# Patient Record
Sex: FEMALE | Race: BLACK OR AFRICAN AMERICAN | NOT HISPANIC OR LATINO | Employment: FULL TIME | URBAN - METROPOLITAN AREA
[De-identification: names, ages, dates, MRNs, and addresses within clinical notes are randomized per-mention and may not be internally consistent; named-entity substitution may affect disease eponyms.]

---

## 2023-05-24 ENCOUNTER — OFFICE VISIT (OUTPATIENT)
Dept: PRIMARY CARE | Facility: CLINIC | Age: 58
End: 2023-05-24
Payer: MEDICAID

## 2023-05-24 VITALS
TEMPERATURE: 98.3 F | RESPIRATION RATE: 16 BRPM | HEIGHT: 63 IN | WEIGHT: 194.2 LBS | DIASTOLIC BLOOD PRESSURE: 81 MMHG | OXYGEN SATURATION: 99 % | HEART RATE: 76 BPM | SYSTOLIC BLOOD PRESSURE: 154 MMHG | BODY MASS INDEX: 34.41 KG/M2

## 2023-05-24 DIAGNOSIS — R10.9 FLANK PAIN: ICD-10-CM

## 2023-05-24 DIAGNOSIS — M79.602 LEFT ARM PAIN: ICD-10-CM
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
## 2023-05-24 DIAGNOSIS — T14.8XXA BRUISING: ICD-10-CM

## 2023-05-24 PROCEDURE — 99214 OFFICE O/P EST MOD 30 MIN: CPT | Performed by: NURSE PRACTITIONER

## 2023-05-24 RX ORDER — CYCLOBENZAPRINE HCL 10 MG
10 TABLET ORAL
COMMUNITY
Start: 2023-05-20

## 2023-05-24 RX ORDER — OXYCODONE AND ACETAMINOPHEN 5; 325 MG/1; MG/1
TABLET ORAL
COMMUNITY
Start: 2023-05-20

## 2023-05-24 ASSESSMENT — ENCOUNTER SYMPTOMS
COUGH: 0
HEMATURIA: 0
ROS GI COMMENTS: LEFT FLANK PAIN
FATIGUE: 0
UNEXPECTED WEIGHT CHANGE: 0
BLOOD IN STOOL: 0
CHEST TIGHTNESS: 0
RECTAL PAIN: 0
TROUBLE SWALLOWING: 0
DIARRHEA: 0
PHOTOPHOBIA: 0
APNEA: 0
JOINT SWELLING: 0
CHOKING: 0
BRUISES/BLEEDS EASILY: 0
HEADACHES: 0
ACTIVITY CHANGE: 0
VOMITING: 0
ABDOMINAL DISTENTION: 0
WHEEZING: 0
ADENOPATHY: 0
WOUND: 0
CONSTIPATION: 0
ANAL BLEEDING: 0
APPETITE CHANGE: 0
NECK PAIN: 0
NAUSEA: 0
NECK STIFFNESS: 0
DIAPHORESIS: 0
CHILLS: 0
WEAKNESS: 0
DIZZINESS: 0
STRIDOR: 0
FEVER: 0
BACK PAIN: 0
LIGHT-HEADEDNESS: 0
SHORTNESS OF BREATH: 0
PALPITATIONS: 0
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
## 2023-07-21 ENCOUNTER — APPOINTMENT (OUTPATIENT)
Dept: PRIMARY CARE | Facility: CLINIC | Age: 58
End: 2023-07-21
Payer: MEDICAID

## 2023-10-06 ENCOUNTER — TELEPHONE (OUTPATIENT)
Dept: PRIMARY CARE | Facility: CLINIC | Age: 58
End: 2023-10-06
Payer: MEDICAID

## 2023-10-06 NOTE — TELEPHONE ENCOUNTER
Najma crawford phoned, [phone].  She has an appointment scheduled for this Monday October 9th @ 8:45. She hurt her foot about a month ago and it still hurts.  She is requesting a refill on her prednisone.     Prednisone  Dosage 20mg   Once a day  Pharmacy: [facility] on [facility]

## 2023-10-09 ENCOUNTER — OFFICE VISIT (OUTPATIENT)
Dept: PRIMARY CARE | Facility: CLINIC | Age: 58
End: 2023-10-09
Payer: MEDICAID

## 2023-10-09 VITALS
OXYGEN SATURATION: 100 % | WEIGHT: 194.8 LBS | HEART RATE: 73 BPM | RESPIRATION RATE: 16 BRPM | DIASTOLIC BLOOD PRESSURE: 88 MMHG | HEIGHT: 63 IN | BODY MASS INDEX: 34.52 KG/M2 | SYSTOLIC BLOOD PRESSURE: 145 MMHG | TEMPERATURE: 98.4 F

## 2023-10-09 DIAGNOSIS — S99.922S INJURY OF LEFT FOOT, SEQUELA: ICD-10-CM

## 2023-10-09 DIAGNOSIS — B35.1 ONYCHOMYCOSIS: ICD-10-CM

## 2023-10-09 DIAGNOSIS — M79.672 LEFT FOOT PAIN: Primary | ICD-10-CM

## 2023-10-09 PROCEDURE — 99213 OFFICE O/P EST LOW 20 MIN: CPT | Performed by: NURSE PRACTITIONER

## 2023-10-09 ASSESSMENT — ENCOUNTER SYMPTOMS
DIAPHORESIS: 0
FEVER: 0
STRIDOR: 0
PALPITATIONS: 0
CHEST TIGHTNESS: 0
WHEEZING: 0
APNEA: 0
APPETITE CHANGE: 0
WEAKNESS: 0
WOUND: 0
COUGH: 0
SHORTNESS OF BREATH: 0
ACTIVITY CHANGE: 0
CHILLS: 0
UNEXPECTED WEIGHT CHANGE: 0
CHOKING: 0
FATIGUE: 0
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
## 2024-01-05 ENCOUNTER — PATIENT OUTREACH (OUTPATIENT)
Dept: PRIMARY CARE | Facility: CLINIC | Age: 59
End: 2024-01-05
Payer: MEDICAID

## 2024-01-05 NOTE — PROGRESS NOTES
Discharge Facility: [facility]  Discharge Diagnosis: facial cellulitis  Admission Date: 1/2/24  Discharge Date:  1/4/24    PCP Appointment Date: TBD - task to office  Specialist Appointment Date: TBD  Hospital Encounter and Summary: Linked   See discharge assessment below for further details: N/A    TCM call completed, however was unable to reach patient during two business days after discharge despite at least two attempts made.

## 2024-01-19 ENCOUNTER — PATIENT OUTREACH (OUTPATIENT)
Dept: PRIMARY CARE | Facility: CLINIC | Age: 59
End: 2024-01-19
Payer: MEDICAID

## 2024-01-19 NOTE — PROGRESS NOTES
Unable to reach patient for call back after patient's follow up appointment with PCP.   CÉSARM with call back number for patient to call if needed   If no voicemail available call attempts x 2 were made to contact the patient to assist with any questions or concerns patient may have.

## 2024-11-06 ENCOUNTER — TELEPHONE (OUTPATIENT)
Dept: HEMATOLOGY/ONCOLOGY | Facility: CLINIC | Age: 59
End: 2024-11-06
Payer: MEDICAID

## 2024-11-06 NOTE — TELEPHONE ENCOUNTER
Patient has an appointment with Dr. Ramachandran  but in March 2025. I called and left a message for this patient to call me back so I can get them an earlier appointment.

## 2024-11-19 PROBLEM — R53.83 FATIGUE: Status: ACTIVE | Noted: 2024-11-19

## 2024-11-19 PROBLEM — H25.13 CATARACT, NUCLEAR SCLEROTIC, BOTH EYES: Status: ACTIVE | Noted: 2024-11-19

## 2024-11-19 PROBLEM — N39.0 UTI (URINARY TRACT INFECTION): Status: ACTIVE | Noted: 2024-11-19

## 2024-11-19 PROBLEM — S16.1XXA ACUTE CERVICAL MYOFASCIAL STRAIN: Status: ACTIVE | Noted: 2023-05-20

## 2024-11-19 PROBLEM — H53.8 BLURRED VISION, BILATERAL: Status: ACTIVE | Noted: 2024-11-19

## 2024-11-19 PROBLEM — R73.9 HYPERGLYCEMIA: Status: ACTIVE | Noted: 2024-11-19

## 2024-11-19 PROBLEM — D48.5 NEOPLASM OF UNCERTAIN BEHAVIOR OF SKIN: Status: ACTIVE | Noted: 2021-09-17
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
## 2024-11-19 PROBLEM — M31.19: Status: ACTIVE | Noted: 2024-11-19

## 2024-11-19 PROBLEM — L03.211 FACIAL CELLULITIS: Status: ACTIVE | Noted: 2024-01-02

## 2024-11-19 PROBLEM — M79.672 PAIN IN LEFT FOOT: Status: ACTIVE | Noted: 2024-11-19

## 2024-11-19 PROBLEM — E66.9 OBESITY (BMI 30-39.9): Status: ACTIVE | Noted: 2024-11-19

## 2024-11-19 PROBLEM — R17 JAUNDICE: Status: ACTIVE | Noted: 2024-11-19

## 2024-11-19 PROBLEM — H35.713: Status: ACTIVE | Noted: 2024-11-19

## 2024-11-19 PROBLEM — E04.1 THYROID NODULE: Status: ACTIVE | Noted: 2024-11-19

## 2024-11-19 RX ORDER — AMOXICILLIN AND CLAVULANATE POTASSIUM 875; 125 MG/1; MG/1
1 TABLET, FILM COATED ORAL
COMMUNITY
Start: 2024-01-02

## 2024-11-19 RX ORDER — CEPHALEXIN 500 MG/1
1 CAPSULE ORAL EVERY 6 HOURS
COMMUNITY
Start: 2024-01-04

## 2025-03-19 ENCOUNTER — APPOINTMENT (OUTPATIENT)
Dept: HEMATOLOGY/ONCOLOGY | Facility: CLINIC | Age: 60
End: 2025-03-19
Payer: MEDICAID

## 2025-05-08 ENCOUNTER — APPOINTMENT (OUTPATIENT)
Dept: PRIMARY CARE | Facility: CLINIC | Age: 60
End: 2025-05-08
Payer: MEDICAID